# Patient Record
Sex: MALE | Race: BLACK OR AFRICAN AMERICAN | NOT HISPANIC OR LATINO | URBAN - METROPOLITAN AREA
[De-identification: names, ages, dates, MRNs, and addresses within clinical notes are randomized per-mention and may not be internally consistent; named-entity substitution may affect disease eponyms.]

---

## 2019-06-03 ENCOUNTER — OFFICE VISIT - HEALTHEAST (OUTPATIENT)
Dept: FAMILY MEDICINE | Facility: CLINIC | Age: 34
End: 2019-06-03

## 2019-06-03 ENCOUNTER — RECORDS - HEALTHEAST (OUTPATIENT)
Dept: GENERAL RADIOLOGY | Facility: CLINIC | Age: 34
End: 2019-06-03

## 2019-06-03 DIAGNOSIS — M54.50 CHRONIC BILATERAL LOW BACK PAIN WITHOUT SCIATICA: ICD-10-CM

## 2019-06-03 DIAGNOSIS — Z02.89 REFUGEE HEALTH EXAMINATION: ICD-10-CM

## 2019-06-03 DIAGNOSIS — G89.29 CHRONIC BILATERAL LOW BACK PAIN WITHOUT SCIATICA: ICD-10-CM

## 2019-06-03 DIAGNOSIS — Z02.89 ENCOUNTER FOR OTHER ADMINISTRATIVE EXAMINATIONS: ICD-10-CM

## 2019-06-03 LAB
ALBUMIN SERPL-MCNC: 4.2 G/DL (ref 3.5–5)
ALBUMIN UR-MCNC: NEGATIVE MG/DL
ALP SERPL-CCNC: 80 U/L (ref 45–120)
ALT SERPL W P-5'-P-CCNC: 39 U/L (ref 0–45)
ANION GAP SERPL CALCULATED.3IONS-SCNC: 12 MMOL/L (ref 5–18)
APPEARANCE UR: CLEAR
AST SERPL W P-5'-P-CCNC: 28 U/L (ref 0–40)
BACTERIA #/AREA URNS HPF: ABNORMAL HPF
BASOPHILS # BLD AUTO: 0 THOU/UL (ref 0–0.2)
BASOPHILS NFR BLD AUTO: 0 % (ref 0–2)
BILIRUB SERPL-MCNC: 0.4 MG/DL (ref 0–1)
BILIRUB UR QL STRIP: NEGATIVE
BUN SERPL-MCNC: 12 MG/DL (ref 8–22)
CALCIUM SERPL-MCNC: 9.9 MG/DL (ref 8.5–10.5)
CHLORIDE BLD-SCNC: 103 MMOL/L (ref 98–107)
CO2 SERPL-SCNC: 22 MMOL/L (ref 22–31)
COLOR UR AUTO: YELLOW
CREAT SERPL-MCNC: 0.92 MG/DL (ref 0.7–1.3)
EOSINOPHIL # BLD AUTO: 0.3 THOU/UL (ref 0–0.4)
EOSINOPHIL NFR BLD AUTO: 8 % (ref 0–6)
ERYTHROCYTE [DISTWIDTH] IN BLOOD BY AUTOMATED COUNT: 11.8 % (ref 11–14.5)
GFR SERPL CREATININE-BSD FRML MDRD: >60 ML/MIN/1.73M2
GLUCOSE BLD-MCNC: 87 MG/DL (ref 70–125)
GLUCOSE UR STRIP-MCNC: NEGATIVE MG/DL
HCT VFR BLD AUTO: 47.4 % (ref 40–54)
HGB BLD-MCNC: 16.3 G/DL (ref 14–18)
HGB UR QL STRIP: ABNORMAL
HIV 1+2 AB+HIV1 P24 AG SERPL QL IA: NEGATIVE
KETONES UR STRIP-MCNC: NEGATIVE MG/DL
LEUKOCYTE ESTERASE UR QL STRIP: NEGATIVE
LYMPHOCYTES # BLD AUTO: 1.7 THOU/UL (ref 0.8–4.4)
LYMPHOCYTES NFR BLD AUTO: 42 % (ref 20–40)
MCH RBC QN AUTO: 29.2 PG (ref 27–34)
MCHC RBC AUTO-ENTMCNC: 34.3 G/DL (ref 32–36)
MCV RBC AUTO: 85 FL (ref 80–100)
MONOCYTES # BLD AUTO: 0.2 THOU/UL (ref 0–0.9)
MONOCYTES NFR BLD AUTO: 6 % (ref 2–10)
NEUTROPHILS # BLD AUTO: 1.7 THOU/UL (ref 2–7.7)
NEUTROPHILS NFR BLD AUTO: 43 % (ref 50–70)
NITRATE UR QL: NEGATIVE
PH UR STRIP: 5.5 [PH] (ref 5–8)
PLATELET # BLD AUTO: 230 THOU/UL (ref 140–440)
PMV BLD AUTO: 8.2 FL (ref 7–10)
POTASSIUM BLD-SCNC: 4.3 MMOL/L (ref 3.5–5)
PROT SERPL-MCNC: 7.1 G/DL (ref 6–8)
RBC # BLD AUTO: 5.58 MILL/UL (ref 4.4–6.2)
RBC #/AREA URNS AUTO: ABNORMAL HPF
SODIUM SERPL-SCNC: 137 MMOL/L (ref 136–145)
SP GR UR STRIP: 1.02 (ref 1–1.03)
SQUAMOUS #/AREA URNS AUTO: ABNORMAL LPF
UROBILINOGEN UR STRIP-ACNC: ABNORMAL
WBC #/AREA URNS AUTO: ABNORMAL HPF
WBC: 4 THOU/UL (ref 4–11)

## 2019-06-03 ASSESSMENT — MIFFLIN-ST. JEOR: SCORE: 1583.07

## 2019-06-04 LAB
C TRACH DNA SPEC QL PROBE+SIG AMP: NEGATIVE
HBV SURFACE AG SERPL QL IA: NEGATIVE
HCV AB SERPL QL IA: NEGATIVE
HEPATITIS B SURFACE ANTIBODY LHE- HISTORICAL: NEGATIVE
MALARIA SMEAR BLD: NORMAL
N GONORRHOEA DNA SPEC QL NAA+PROBE: NEGATIVE
T PALLIDUM AB SER QL: NEGATIVE

## 2019-06-05 LAB
GAMMA INTERFERON BACKGROUND BLD IA-ACNC: 0.05 IU/ML
HBV CORE AB SERPL QL IA: NEGATIVE
M TB IFN-G BLD-IMP: NEGATIVE
MITOGEN IGNF BCKGRD COR BLD-ACNC: 0.01 IU/ML
MITOGEN IGNF BCKGRD COR BLD-ACNC: 0.03 IU/ML
QTF INTERPRETATION: NORMAL
QTF MITOGEN - NIL: 8.49 IU/ML
VZV IGG SER QL IA: POSITIVE

## 2019-06-06 ENCOUNTER — COMMUNICATION - HEALTHEAST (OUTPATIENT)
Dept: FAMILY MEDICINE | Facility: CLINIC | Age: 34
End: 2019-06-06

## 2019-06-19 ENCOUNTER — OFFICE VISIT - HEALTHEAST (OUTPATIENT)
Dept: FAMILY MEDICINE | Facility: CLINIC | Age: 34
End: 2019-06-19

## 2019-06-19 ENCOUNTER — RECORDS - HEALTHEAST (OUTPATIENT)
Dept: GENERAL RADIOLOGY | Facility: CLINIC | Age: 34
End: 2019-06-19

## 2019-06-19 DIAGNOSIS — M54.50 LOW BACK PAIN: ICD-10-CM

## 2019-06-19 DIAGNOSIS — R31.9 HEMATURIA, UNSPECIFIED TYPE: ICD-10-CM

## 2019-06-19 DIAGNOSIS — M54.50 CHRONIC BILATERAL LOW BACK PAIN WITHOUT SCIATICA: ICD-10-CM

## 2019-06-19 DIAGNOSIS — G89.29 OTHER CHRONIC PAIN: ICD-10-CM

## 2019-06-19 DIAGNOSIS — G89.29 CHRONIC BILATERAL LOW BACK PAIN WITHOUT SCIATICA: ICD-10-CM

## 2019-06-19 DIAGNOSIS — Z76.89 ENCOUNTER TO ESTABLISH CARE: ICD-10-CM

## 2019-06-19 LAB
ALBUMIN UR-MCNC: NEGATIVE MG/DL
APPEARANCE UR: CLEAR
BACTERIA #/AREA URNS HPF: ABNORMAL HPF
BILIRUB UR QL STRIP: NEGATIVE
COLOR UR AUTO: YELLOW
GLUCOSE UR STRIP-MCNC: NEGATIVE MG/DL
HGB UR QL STRIP: ABNORMAL
KETONES UR STRIP-MCNC: NEGATIVE MG/DL
LEUKOCYTE ESTERASE UR QL STRIP: NEGATIVE
NITRATE UR QL: NEGATIVE
PH UR STRIP: 6 [PH] (ref 5–8)
RBC #/AREA URNS AUTO: ABNORMAL HPF
SP GR UR STRIP: 1.02 (ref 1–1.03)
SQUAMOUS #/AREA URNS AUTO: ABNORMAL LPF
UROBILINOGEN UR STRIP-ACNC: ABNORMAL
WBC #/AREA URNS AUTO: ABNORMAL HPF

## 2019-06-19 ASSESSMENT — MIFFLIN-ST. JEOR: SCORE: 1585

## 2019-06-21 ENCOUNTER — COMMUNICATION - HEALTHEAST (OUTPATIENT)
Dept: FAMILY MEDICINE | Facility: CLINIC | Age: 34
End: 2019-06-21

## 2019-07-22 ENCOUNTER — OFFICE VISIT - HEALTHEAST (OUTPATIENT)
Dept: FAMILY MEDICINE | Facility: CLINIC | Age: 34
End: 2019-07-22

## 2019-07-22 DIAGNOSIS — R31.29 OTHER MICROSCOPIC HEMATURIA: ICD-10-CM

## 2019-07-22 LAB
ALBUMIN UR-MCNC: NEGATIVE MG/DL
APPEARANCE UR: CLEAR
BACTERIA #/AREA URNS HPF: ABNORMAL HPF
BILIRUB UR QL STRIP: NEGATIVE
COLOR UR AUTO: YELLOW
GLUCOSE UR STRIP-MCNC: NEGATIVE MG/DL
HGB UR QL STRIP: ABNORMAL
KETONES UR STRIP-MCNC: NEGATIVE MG/DL
LEUKOCYTE ESTERASE UR QL STRIP: NEGATIVE
NITRATE UR QL: NEGATIVE
PH UR STRIP: 5.5 [PH] (ref 5–8)
RBC #/AREA URNS AUTO: ABNORMAL HPF
SP GR UR STRIP: 1.01 (ref 1–1.03)
SQUAMOUS #/AREA URNS AUTO: ABNORMAL LPF
UROBILINOGEN UR STRIP-ACNC: ABNORMAL
WBC #/AREA URNS AUTO: ABNORMAL HPF

## 2019-07-26 ENCOUNTER — HOSPITAL ENCOUNTER (OUTPATIENT)
Dept: ULTRASOUND IMAGING | Facility: HOSPITAL | Age: 34
Discharge: HOME | End: 2019-07-26
Attending: FAMILY MEDICINE

## 2019-07-26 DIAGNOSIS — R31.29 OTHER MICROSCOPIC HEMATURIA: ICD-10-CM

## 2019-07-29 ENCOUNTER — COMMUNICATION - HEALTHEAST (OUTPATIENT)
Dept: FAMILY MEDICINE | Facility: CLINIC | Age: 34
End: 2019-07-29

## 2020-08-10 ENCOUNTER — COMMUNICATION - HEALTHEAST (OUTPATIENT)
Dept: FAMILY MEDICINE | Facility: CLINIC | Age: 35
End: 2020-08-10

## 2020-08-10 ENCOUNTER — OFFICE VISIT - HEALTHEAST (OUTPATIENT)
Dept: FAMILY MEDICINE | Facility: CLINIC | Age: 35
End: 2020-08-10

## 2020-08-10 DIAGNOSIS — Z00.00 ROUTINE GENERAL MEDICAL EXAMINATION AT A HEALTH CARE FACILITY: ICD-10-CM

## 2020-08-10 DIAGNOSIS — R31.9 HEMATURIA, UNSPECIFIED TYPE: ICD-10-CM

## 2020-08-10 LAB
ALBUMIN SERPL-MCNC: 4.2 G/DL (ref 3.5–5)
ALBUMIN UR-MCNC: NEGATIVE MG/DL
ALP SERPL-CCNC: 88 U/L (ref 45–120)
ALT SERPL W P-5'-P-CCNC: 33 U/L (ref 0–45)
ANION GAP SERPL CALCULATED.3IONS-SCNC: 10 MMOL/L (ref 5–18)
APPEARANCE UR: CLEAR
AST SERPL W P-5'-P-CCNC: 22 U/L (ref 0–40)
BACTERIA #/AREA URNS HPF: ABNORMAL HPF
BILIRUB SERPL-MCNC: 0.3 MG/DL (ref 0–1)
BILIRUB UR QL STRIP: NEGATIVE
BUN SERPL-MCNC: 7 MG/DL (ref 8–22)
CALCIUM SERPL-MCNC: 9.6 MG/DL (ref 8.5–10.5)
CHLORIDE BLD-SCNC: 105 MMOL/L (ref 98–107)
CHOLEST SERPL-MCNC: 205 MG/DL
CO2 SERPL-SCNC: 25 MMOL/L (ref 22–31)
COLOR UR AUTO: YELLOW
CREAT SERPL-MCNC: 1 MG/DL (ref 0.7–1.3)
FASTING STATUS PATIENT QL REPORTED: YES
GFR SERPL CREATININE-BSD FRML MDRD: >60 ML/MIN/1.73M2
GLUCOSE BLD-MCNC: 108 MG/DL (ref 70–125)
GLUCOSE UR STRIP-MCNC: NEGATIVE MG/DL
HDLC SERPL-MCNC: 23 MG/DL
HGB UR QL STRIP: ABNORMAL
KETONES UR STRIP-MCNC: NEGATIVE MG/DL
LDLC SERPL CALC-MCNC: 130 MG/DL
LEUKOCYTE ESTERASE UR QL STRIP: NEGATIVE
MUCOUS THREADS #/AREA URNS LPF: ABNORMAL LPF
NITRATE UR QL: NEGATIVE
PH UR STRIP: 5 [PH] (ref 5–8)
POTASSIUM BLD-SCNC: 4.2 MMOL/L (ref 3.5–5)
PROT SERPL-MCNC: 7.3 G/DL (ref 6–8)
RBC #/AREA URNS AUTO: ABNORMAL HPF
SODIUM SERPL-SCNC: 140 MMOL/L (ref 136–145)
SP GR UR STRIP: 1.02 (ref 1–1.03)
SQUAMOUS #/AREA URNS AUTO: ABNORMAL LPF
TRIGL SERPL-MCNC: 258 MG/DL
UROBILINOGEN UR STRIP-ACNC: ABNORMAL
WBC #/AREA URNS AUTO: ABNORMAL HPF

## 2020-08-10 ASSESSMENT — MIFFLIN-ST. JEOR: SCORE: 1617.37

## 2020-08-12 ENCOUNTER — COMMUNICATION - HEALTHEAST (OUTPATIENT)
Dept: FAMILY MEDICINE | Facility: CLINIC | Age: 35
End: 2020-08-12

## 2020-10-21 ENCOUNTER — COMMUNICATION - HEALTHEAST (OUTPATIENT)
Dept: ADMINISTRATIVE | Facility: CLINIC | Age: 35
End: 2020-10-21

## 2021-05-29 NOTE — TELEPHONE ENCOUNTER
Left message to call back for: Heladio-using Estonian   Information to relay to patient:      ----- Message from Martha Rose MD sent at 6/6/2019  9:04 AM CDT -----  Dear Heladio,   I am covering provider for  Dr Rome  I reviewed Your  recent  lab results All  Of them  back with in normal limit, except trace amount of blood in urine   Which you can follow up at next visit   We will mail the results as you might need for your immigration form.   Please feel free to call back for any concerns or questions.  Thanks for choosing our clinic  for your care.    Martha Rose MD

## 2021-05-29 NOTE — PROGRESS NOTES
A/P:  34 y.o. male from [place] is here with an  for a refugee health examination.  Patient received a  Department of [facility] medical examination for immigrant or refugee on 1/27/19.  Patient arrived in the [place] on 5[place]15[place]19.  Patient is living with uncle here in [place], [place].  At present he is healthy and does not have any chronic medical additions or on medications.  I will obtain the standard screening.  We discussed healthy lifestyle, nutrition, cardiovascular risk reduction, self care, safety, sunscreen, seatbelt screening, mental health screen, and vision/hearing/dental screen.  Health maintenance screening and immunizations reviewed with the patient.  Questions were answered.      1. Refugee health examination  - XR Chest 2 Views; Future  - QTF-Mycobacterium tuberculosis by QuantiFERON-TB Gold Plus  - Hepatitis B Surface Antibody (Anti-HBs)  - Hepatitis B Surface Antigen (HBsAG)  - Hepatitis B Core Antibody (Anti-HBc)  - Hepatitis C Antibody (Anti-HCV)  - Syphilis Screen, Cascade  - Chlamydia trachomatis & Neisseria gonorrhoeae, Amplified Detection  - HIV Antigen/Antibody Screening Cascade  - HM1(CBC and Differential)  - Malaria Smear, Blood  - Varicella Zoster Antibody, IgG  - Urinalysis  - Comprehensive Metabolic Panel  - HM1 (CBC with Diff)    2. Chronic bilateral low back pain without sciatica  Intermittent without any limitation in function  Over-the-counter analgesics and proper lifting techniques      [place] initial refugee health assessment form will be completed once results are made available.  Please refer to the form for more detail  I have personally reviewed  Department of [facility] Medical Examination for Immigrant or Refugee Application, Immunization history, Tuberculosis Screening, Hepatitis B screening, Sexually Transmitted Infection history, Parasite Screening, Malaria Screening, Medication history, and other pre-entrance screening.    SUBJECTIVE:  Heladio Richey is a 34  y.o. male here for refugee screening.     The patient was born in [facility].  He had a pre-to departure examination January 27, 2019.  He arrived in the [place] on May 15, 2019 and is living here in [place] with his uncle.  He does not have any past medical history.  No past surgical history.  He takes no medications.  No known family history.    He had a chest x-ray that was unremarkable for tuberculosis.  He had pre-departure antihelminthic treatment with praziquantel, ivermectin, and albendazole May 2019.  He was also treated with Coartem May 12, 2019.    The patient says his only concern is that of chronic back pain that he had about 8 to 9 years ago.  It started when he was falsely imprisoned and was sleeping on a cold cement floor.  His back pain only acts up when he is standing for prolonged period of time or lifting heavy objects.  Does not complain of back pain on a regular basis.  No radicular symptoms.  No bowel bladder dysfunction    Pre departure from medical screening forms and vaccines were reviewed.    No Known Allergies  No current outpatient medications on file.     No current facility-administered medications for this visit.      No past medical history on file.  No past surgical history on file.  Social History     Socioeconomic History     Marital status: Single     Spouse name: None     Number of children: None     Years of education: None     Highest education level: None   Occupational History     None   Social Needs     Financial resource strain: None     Food insecurity:     Worry: None     Inability: None     Transportation needs:     Medical: None     Non-medical: None   Tobacco Use     Smoking status: Never Smoker     Smokeless tobacco: Never Used   Substance and Sexual Activity     Alcohol use: None     Drug use: None     Sexual activity: None   Lifestyle     Physical activity:     Days per week: None     Minutes per session: None     Stress: None   Relationships     Social  "connections:     Talks on phone: None     Gets together: None     Attends Quaker service: None     Active member of club or organization: None     Attends meetings of clubs or organizations: None     Relationship status: None     Intimate partner violence:     Fear of current or ex partner: None     Emotionally abused: None     Physically abused: None     Forced sexual activity: None   Other Topics Concern     None   Social History Narrative     None     No family history on file.      Review of Systems   Constitutional: Negative.   HENT: Negative.   Eyes: Negative.   Respiratory: Negative.   Cardiovascular: Negative.   Gastrointestinal: Negative.   Endocrine: Negative.   Genitourinary: Negative.   Musculoskeletal: Negative.   Skin: Negative.   Allergic/Immunologic: Negative.   Neurological: Negative.   Hematological: Negative.   Psychiatric/Behavioral: Negative.       Objective:    Physical Exam   Vitals:    06/03/19 1112   BP: 100/68   Pulse: 74   Temp: 98.7  F (37.1  C)   TempSrc: Oral   Weight: 150 lb 11.2 oz (68.4 kg)   Height: 5' 8\" (1.727 m)       Constitutional: Patient is oriented to person, place, and time. Patient appears well-developed and well-nourished. No distress.   Head: Normocephalic and atraumatic.   Right Ear: External ear normal.   Left Ear: External ear normal.   Nose: Nose normal.   Mouth/Throat: Oropharynx is clear and moist. No oropharyngeal exudate.   Eyes: Conjunctivae and EOM are normal. Pupils are equal, round, and reactive to light. Right eye exhibits no discharge. Left eye exhibits no discharge. No scleral icterus.   Neck: Neck supple. No JVD present. No tracheal deviation present. No thyromegaly present.   Cardiovascular: Normal rate, regular rhythm, normal heart sounds and intact distal pulses. No murmur heard.   Pulmonary/Chest: Effort normal and breath sounds normal. No stridor. No respiratory distress. Patient has no wheezes, no rales, exhibits no tenderness.   Abdominal: Soft. " Bowel sounds are normal. Patient exhibits no distension and no mass. There is no tenderness. There is no rebound and no guarding.   Lymphadenopathy:  Patient has no cervical adenopathy.   Neurological: Patient is alert and oriented to person, place, and time. Patient has normal reflexes. No cranial nerve deficit. Coordination normal.   Skin: Skin is warm and dry. No rash noted. Patient is not diaphoretic. No erythema. No pallor.   Range of motion of his back.  Negative straight leg raising.  No tenderness to palpation of the spinous or paraspinous process     A total of 60 minutes visit with over 50% of the time spent on counseling the patient and in coordinating care.

## 2021-05-29 NOTE — TELEPHONE ENCOUNTER
----- Message from Claire Roberts MD sent at 6/21/2019 10:47 AM CDT -----  Please inform the patient that the x-ray that was done for his low back pain was normal.  At this point if the pain continues my consideration will be to have him start physical therapy.  Let me know if he wants to do that.

## 2021-05-29 NOTE — PROGRESS NOTES
ASSESSMENT:  1. Encounter to establish care    2. Chronic bilateral low back pain without sciatica  - XR Lumbar Spine 2 or 3 VWS; Future  - cyclobenzaprine (FLEXERIL) 5 MG tablet; Take 1 tablet (5 mg total) by mouth at bedtime as needed for muscle spasms.  Dispense: 20 tablet; Refill: 0  - naproxen (NAPROSYN) 500 MG tablet; Take 1 tablet (500 mg total) by mouth 2 (two) times a day with meals.  Dispense: 60 tablet; Refill: 2  As noted he has had chronic low back pain which is bilateral, he does not really know the cause.  We will go ahead and get an x-ray of his lower back.  He does also have muscle spasms as well noted in the lower back as well as he trapezius region.  Will start him on treatment with muscle relaxants as well as some naproxen.  Will inform him of the x-ray report and consider more management depending on the report.  3. Hematuria, unspecified type  - Urinalysis-UC if Indicated  Also because of his hematuria is unknown we will recheck the urine which still shows a small amount of red blood cells but microscopy was within normal.  I think we will give him some time and may be in 1 month we will recheck it again and if still positive will do a cystoscopy.    PLAN:  There are no Patient Instructions on file for this visit.    Orders Placed This Encounter   Procedures     XR Lumbar Spine 2 or 3 VWS     Standing Status:   Future     Standing Expiration Date:   6/19/2020     Order Specific Question:   Can the procedure be changed per Radiologist protocol?     Answer:   Yes     Urinalysis-UC if Indicated     There are no discontinued medications.    No follow-ups on file.      CHIEF COMPLAINT:  Chief Complaint   Patient presents with     Test Results     follow up        HISTORY OF PRESENT ILLNESS:  Heladio is a 34 y.o. male presenting to the clinic today to establish care.  He was seen initially at the clinic for  refugee exam.  At that time he was noted to have microscopic hematuria.  He also tested  negative for all of the evaluations.  He is not having any notable symptoms except for lower back pain which he had complained about in the past.  And noted that this has been for a long time and he remembers that it is starting when he was doing incarcerated in [place] and he had to sleep on the floor.  He noted radiation of the pain down to the legs but no numbness.  He noted no weaknesses to his lower extremity and is able to use his leg appropriately.  He also has some stiffness to the neck but that is not bothersome.  He otherwise feels well.  REVIEW OF SYSTEMS:   He denies having any headaches and no arm pain.  Denies having any chest pain or shortness of breath.  No wheezing.  He notes no dysuria no urinary frequency.  Does not have any discharges from his penis. All other systems are negative.    PFSH:  Reviewed, as below.    Social History     Tobacco Use   Smoking Status Never Smoker   Smokeless Tobacco Never Used       No family history on file.    Social History     Socioeconomic History     Marital status: Single     Spouse name: Not on file     Number of children: Not on file     Years of education: Not on file     Highest education level: Not on file   Occupational History     Not on file   Social Needs     Financial resource strain: Not on file     Food insecurity:     Worry: Not on file     Inability: Not on file     Transportation needs:     Medical: Not on file     Non-medical: Not on file   Tobacco Use     Smoking status: Never Smoker     Smokeless tobacco: Never Used   Substance and Sexual Activity     Alcohol use: Not on file     Drug use: Not on file     Sexual activity: Not on file   Lifestyle     Physical activity:     Days per week: Not on file     Minutes per session: Not on file     Stress: Not on file   Relationships     Social connections:     Talks on phone: Not on file     Gets together: Not on file     Attends Evangelical service: Not on file     Active member of club or organization:  "Not on file     Attends meetings of clubs or organizations: Not on file     Relationship status: Not on file     Intimate partner violence:     Fear of current or ex partner: Not on file     Emotionally abused: Not on file     Physically abused: Not on file     Forced sexual activity: Not on file   Other Topics Concern     Not on file   Social History Narrative     Not on file       No past surgical history on file.    No Known Allergies    Active Ambulatory Problems     Diagnosis Date Noted     No Active Ambulatory Problems     Resolved Ambulatory Problems     Diagnosis Date Noted     No Resolved Ambulatory Problems     No Additional Past Medical History       Current Outpatient Medications   Medication Sig Dispense Refill     cyclobenzaprine (FLEXERIL) 5 MG tablet Take 1 tablet (5 mg total) by mouth at bedtime as needed for muscle spasms. 20 tablet 0     naproxen (NAPROSYN) 500 MG tablet Take 1 tablet (500 mg total) by mouth 2 (two) times a day with meals. 60 tablet 2     No current facility-administered medications for this visit.        VITALS:  Vitals:    06/19/19 1014   BP: 102/68   Pulse: 61   SpO2: 100%   Weight: 151 lb 2 oz (68.5 kg)   Height: 5' 8\" (1.727 m)     Wt Readings from Last 3 Encounters:   06/19/19 151 lb 2 oz (68.5 kg)   06/03/19 150 lb 11.2 oz (68.4 kg)     Body mass index is 22.98 kg/m .    PHYSICAL EXAM:  General Appearance: Alert, cooperative, no distress, appears stated age  HEENT: Pupils are equal and reactive, extraocular motions is normal. Neck is supple no notable thyromegaly.  External ears are normal.  Lungs: Clear to auscultation bilaterally, respirations unlabored  Heart: Regular rate and rhythm, S1 and S2 normal  Abdomen: Soft  Musculoskeletal: Normal range of motion. No joint swelling or deformity. There is mild muscle spasms noted on the neck trapezius region.  Also spasms of the paraspinal muscles but no tenderness in the middle of the lower back.  Straight leg raising was " normal and he does not have any weaknesses on the lower extremities.  Neurologic:  Alert and oriented times 3. Normal reflexes. Cranial nerves II-XII intact.   Psychiatric: Normal mood and affect.    MEDICATIONS:  Current Outpatient Medications   Medication Sig Dispense Refill     cyclobenzaprine (FLEXERIL) 5 MG tablet Take 1 tablet (5 mg total) by mouth at bedtime as needed for muscle spasms. 20 tablet 0     naproxen (NAPROSYN) 500 MG tablet Take 1 tablet (500 mg total) by mouth 2 (two) times a day with meals. 60 tablet 2     No current facility-administered medications for this visit.

## 2021-05-29 NOTE — TELEPHONE ENCOUNTER
did call -   Left message for pt to call back     Please relay results as below    Thank you  Iliana Tiwari, CMA

## 2021-05-30 NOTE — PROGRESS NOTES
Assessment/Plan:        Diagnoses and all orders for this visit:    Other microscopic hematuria  -     Urinalysis-UC if Indicated  -     US Kidney Bilateral; Future; Expected date: 07/22/2019  -     Cancel: US Bladder; Future; Expected date: 07/22/2019  A repeat urinalysis today did show trace blood.  Since we are still not sure where that is coming from I did order for an ultrasound of the kidneys bilaterally as well as getting a bladder ultrasound.  Depending on the report we will consider either watchful waiting or referral.  This was explained to the patient who agrees.        Subjective:    Patient ID: Heladio Richey is a 34 y.o. male.    34-year-old male coming in today for for a follow-up of microscopic hematuria.  He has had a 2 urinalysis that showed small amount of blood in his urine and urinalysis otherwise negative.  He had been advised to come in for follow-up test.  He noted no other symptoms at this time.  Denied any abdominal pain no nausea no vomiting.  He has had no visual site of red blood in his urine.  Denied any ureteral discharges.  Lab that was done in the past including chlamydia were negative.  Has had no nausea no vomiting.      The following portions of the patient's history were reviewed and updated as appropriate: allergies, current medications, past family history, past medical history, past social history, past surgical history and problem list.    Review of Systems   Constitutional: Negative.    HENT: Negative.    Gastrointestinal: Negative.    Genitourinary: Negative.    Musculoskeletal: Negative.      Vitals:    07/22/19 1025   BP: 102/72   Pulse: 84   Weight: 152 lb (68.9 kg)             Objective:    Physical Exam   Constitutional: He appears well-developed and well-nourished. No distress.   Cardiovascular: Intact distal pulses.   Pulmonary/Chest: Effort normal.   Abdominal: Soft. There is no tenderness.

## 2021-06-03 VITALS — HEIGHT: 68 IN | BODY MASS INDEX: 22.84 KG/M2 | WEIGHT: 150.7 LBS

## 2021-06-03 VITALS — BODY MASS INDEX: 23.11 KG/M2 | WEIGHT: 152 LBS

## 2021-06-03 VITALS — BODY MASS INDEX: 22.9 KG/M2 | HEIGHT: 68 IN | WEIGHT: 151.13 LBS

## 2021-06-04 VITALS
HEIGHT: 68 IN | WEIGHT: 157.6 LBS | SYSTOLIC BLOOD PRESSURE: 126 MMHG | DIASTOLIC BLOOD PRESSURE: 86 MMHG | HEART RATE: 88 BPM | BODY MASS INDEX: 23.89 KG/M2

## 2021-06-10 NOTE — TELEPHONE ENCOUNTER
When I called the mobile number listed - pt's cousin answered and states pt does not have a phone and uses his cousin's phone as well when needed. His cousin will have pt call back to the clinic.   Please give pt the second message of results in another encounter.   Thank You

## 2021-06-10 NOTE — TELEPHONE ENCOUNTER
----- Message from Claire Roberts MD sent at 8/11/2020  7:22 PM CDT -----  Please inform the patient that his labs that he did for the recent physical exam did show that he has high cholesterol levels.  At this point it is not to the extent that we use medication but we will encourage him strongly to watch his diet as well as be physically active.  Would like to recheck this again within the next 6 months.  Let me know if he has any questions.

## 2021-06-10 NOTE — TELEPHONE ENCOUNTER
With [employer], pt was given his message of results from provider regarding referral for Urology. Pt did get a call from Urology, but could not take the call and will have to call back to the Urologist to scheduled with them. Verified that he states understanding.  Pt also given the message of his lab results from another telephone encounter and he states understanding.   Pt is trying to get his green card - Immigration requesting lab results and OV note. I did print these off for the patient and placed up front for .

## 2021-06-10 NOTE — TELEPHONE ENCOUNTER
----- Message from Claire Roberts MD sent at 8/10/2020  2:54 PM CDT -----  Please call the patient to inform him that his urine still showed moderate amount of blood.  I think that at this time I am going to go ahead and have him referred to see the urologist as we discussed.  I did put in a referral.  Please help him to transfer to referral specialist.  The rest of the lab results are not yet out and will inform him once they are.

## 2021-06-19 NOTE — LETTER
Letter by Claire Roberts MD at      Author: Claire Roberts MD Service: -- Author Type: --    Filed:  Encounter Date: 7/29/2019 Status: (Other)         Heladio Richey  [address] [place] [id number]   July 29, 2019         Dear Mr. Richey,    Below are the results from your recent visit:    Resulted Orders   US Kidney Bilateral    Narrative    EXAM: US KIDNEY BILATERAL WITH BLADDER  LOCATION: [facility]  DATE/TIME: 7/26/2019 10:52 AM    INDICATION: Blood in the Urine  COMPARISON: None.  TECHNIQUE: Routine kidneys and bladder.    FINDINGS:  Right kidney measures 10.7 cm. There is no hydronephrosis, focal mass, or shadowing stone. Renal echotexture is normal.    Left kidney measures 10.3 cm. There is no hydronephrosis, focal mass, or shadowing stone. Renal echotexture is normal.    Bladder is normal.      Impression    CONCLUSION:  Normal ultrasound of the kidneys.        Please inform the patient that the Ultrasound of the Kidney and Bladder are normal.As we discussed I  will wait for about 2 months and recheck the urine.Please encourage to push water.     Please call with questions or contact us using [employer].    Sincerely,        Electronically signed by Claire Roberts MD

## 2021-06-21 NOTE — LETTER
Letter by Moira Romero at      Author: Moira Romero Service: -- Author Type: --    Filed:  Encounter Date: 10/21/2020 Status: (Other)         Jackiegwen STEPHENS Rejitriston  [address] [place] [id number]           10/21/20       Dear Heladio:      At [facility], we care about your health and well-being.  Your primary care provider is committed to ensuring you receive high quality care and has chosen a network of specialists to assist in providing that care.  Recently Dr. Roberts referred you to [facility] for specialty care.      It is important to your overall health to follow through with the referral from your care provider.  Please call [facility] [phone] at your earliest convenience for assistance in scheduling an appointment with the recommended specialist.  If you have already scheduled an appointment, please disregard this notice.  Thank you for choosing the [facility] for your healthcare needs.        Sincerely,        Electronically signed by Moira Romero      Scheduling/Referral Coordinator   [facility]

## 2021-06-29 NOTE — PROGRESS NOTES
Progress Notes by Claire Roberts MD at 8/10/2020  1:00 PM     Author: Claire Roberts MD Service: -- Author Type: Physician    Filed: 8/10/2020  2:54 PM Encounter Date: 8/10/2020 Status: Signed    : Claire Roberts MD (Physician)       MALE PREVENTATIVE EXAM    Assessment and Plan:       1. Routine general medical examination at a health care facility  - Lipid Cascade  - Comprehensive Metabolic Panel    2. Hematuria, unspecified type  - Urinalysis-UC if Indicated  He is not fasted today but since he lives far away, will get his labs today. He had a past history of Hematuria, with recheck of his urine, he still has hematuria, I will refer him to a Urologist for further evaluation.  We will get the rest of his labs though he is not fasted today.    Next follow up:  No follow-ups on file.    Immunization Review  Adult Imm Review: No immunizations due today    I discussed the following with the patient:   Adult Healthy Living: Importance of regular exercise  Healthy nutrition  Getting adequate sleep  Stress management        Subjective:   Chief Complaint: Heladio Richey is an 35 y.o. male here for a preventative health visit.     HPI:  Comes in today for routine physical exam. Has had no new changes in his history.  He does continue to be physically active.  Does cycle, and does not have any major concerns today.  He has noted no urinary symptoms at all at this time.  But did have a past history of having had microscopic hematuria which was partially evaluated.  We will get his urine again today and then consider what to do regarding that.  So that he does not have any other concerns.    Healthy Habits  Are you taking a daily aspirin? No  Do you typically exercising at least 40 min, 3-4 times per week?  Yes walking and bicycling   Do you usually eat at least 4 servings of fruit and vegetables a day, include whole grains and fiber and avoid regularly eating high fat  "foods? Yes  Have you had an eye exam in the past two years? NO  Do you see a dentist twice per year? NO  Do you have any concerns regarding sleep? No    Safety Screen  If you own firearms, are they secured in a locked gun cabinet or with trigger locks? No guns   No data recorded    Review of Systems:    Constitutional:Denied any fatigue no fevers no chills.  Has good appetite.  HEENT: Does not have any neck pain.  No difficulty swallowing denies having any postnasal drips.    Respiratory: There is no cough.  No chest wall pain.  Cardiovascular: Denied chest pain shortness of breath or palpitations.  There is no notable lower extremity swelling.    Gastrointestinal: Denies nausea vomiting.  No abdominal pain no diarrhea or constipation.  Endocrine:Has no sensitivity to cold or heat.  Denied undue thirst.   Genitourinary:Has no urinary symptoms, no nocturia.  Musculoskeletal: There is no musculoskeletal pain and swelling.    Skin: He does not have any rashes.   Allergic/Immunologic: Negative.   Neurological: No Numbness  Hematological: Negative.   Psychiatric/Behavioral: No anxiety or depression symptoms.  Please see above.  The rest of the review of systems are negative for all systems.     Cancer Screening     Patient has no health maintenance due at this time          Patient Care Team:  Claire Roberts MD as PCP - General (Family Medicine)  Claire Roberts MD as Assigned PCP        History     Not marked as reviewed during this visit.            Objective:   Vital Signs:   Vitals:    08/10/20 1313   BP: 126/86   Patient Site: Left Arm   Patient Position: Sitting   Cuff Size: Adult Large   Pulse: 88   Weight: 157 lb 9.6 oz (71.5 kg)   Height: 5' 8.19\" (1.732 m)        Physical Exam:  General Appearance: Alert, cooperative, no distress, appears stated age  Head: Normocephalic, without obvious abnormality, atraumatic  Eyes: PERRL, conjunctiva/corneas clear, EOM's intact  Ears: Normal TM's " and external ear canals, both ears  Nose: Nares normal, septum midline,mucosa normal, no drainage  Throat: Lips, mucosa, and tongue normal; teeth and gums normal  Neck: Supple, symmetrical, trachea midline, no adenopathy;  thyroid: not enlarged, symmetric, no tenderness.  Back: Symmetric, no curvature, ROM normal, no CVA tenderness  Lungs: Clear to auscultation bilaterally, respirations unlabored  Heart: Regular rate and rhythm, S1 and S2 normal, no murmur, rub, or gallop,  Abdomen: Soft, non-tender, bowel sounds active all four quadrants,  no masses, no organomegaly  Musculoskeletal: Normal range of motion. No joint swelling or deformity.   Extremities: Extremities normal, atraumatic, no cyanosis or edema  Skin: Skin color, texture, turgor normal, no rashes or lesions  Lymph nodes: Cervical, supraclavicular, and axillary nodes normal  Neurologic: He is alert. He has normal reflexes.   Psychiatric: He has a normal mood and affect.              Medication List          Accurate as of August 10, 2020  1:51 PM. If you have any questions, ask your nurse or doctor.            STOP taking these medications    cyclobenzaprine 5 MG tablet  Also known as:  FLEXERIL  Stopped by:  Claire Roberts MD            Additional Screenings Completed Today: